# Patient Record
Sex: FEMALE | Race: WHITE | NOT HISPANIC OR LATINO
[De-identification: names, ages, dates, MRNs, and addresses within clinical notes are randomized per-mention and may not be internally consistent; named-entity substitution may affect disease eponyms.]

---

## 2024-10-08 ENCOUNTER — RX ONLY (OUTPATIENT)
Age: 59
Setting detail: RX ONLY
End: 2024-10-08

## 2024-10-08 ENCOUNTER — APPOINTMENT (RX ONLY)
Dept: URBAN - METROPOLITAN AREA CLINIC 61 | Facility: CLINIC | Age: 59
Setting detail: DERMATOLOGY
End: 2024-10-08

## 2024-10-08 DIAGNOSIS — L60.8 OTHER NAIL DISORDERS: ICD-10-CM

## 2024-10-08 DIAGNOSIS — L40.0 PSORIASIS VULGARIS: ICD-10-CM | Status: INADEQUATELY CONTROLLED

## 2024-10-08 DIAGNOSIS — L40.59 OTHER PSORIATIC ARTHROPATHY: ICD-10-CM

## 2024-10-08 PROCEDURE — 99204 OFFICE O/P NEW MOD 45 MIN: CPT

## 2024-10-08 PROCEDURE — ? COUNSELING

## 2024-10-08 PROCEDURE — ? PRESCRIPTION

## 2024-10-08 PROCEDURE — ? HUMIRA INITIATION

## 2024-10-08 PROCEDURE — ? ORDER TESTS

## 2024-10-08 RX ORDER — CLOBETASOL PROPIONATE 0.5 MG/G
CREAM TOPICAL BID
Qty: 60 | Refills: 12 | Status: ERX | COMMUNITY
Start: 2024-10-08

## 2024-10-08 RX ORDER — ADALIMUMAB 40MG/0.4ML
KIT SUBCUTANEOUS
Qty: 2 | Refills: 12 | Status: ERX | COMMUNITY
Start: 2024-10-08

## 2024-10-08 RX ADMIN — CLOBETASOL PROPIONATE: 0.5 CREAM TOPICAL at 00:00

## 2024-10-08 ASSESSMENT — LOCATION DETAILED DESCRIPTION DERM
LOCATION DETAILED: LEFT SECOND METACARPOPHALANGEAL JOINT
LOCATION DETAILED: LEFT KNEE JOINT
LOCATION DETAILED: RIGHT THUMBNAIL
LOCATION DETAILED: RIGHT FIRST METACARPOPHALANGEAL JOINT
LOCATION DETAILED: RIGHT THUMBNAIL
LOCATION DETAILED: RIGHT SUPRAPUBIC SKIN
LOCATION DETAILED: RIGHT INDEX FINGERNAIL
LOCATION DETAILED: LEFT THUMBNAIL
LOCATION DETAILED: LEFT ANTERIOR DISTAL THIGH
LOCATION DETAILED: PERIUNGUAL SKIN LEFT MIDDLE FINGER
LOCATION DETAILED: SUPERIOR MID FOREHEAD
LOCATION DETAILED: RIGHT MIDDLE FINGERNAIL
LOCATION DETAILED: LEFT THUMBNAIL
LOCATION DETAILED: PERIUMBILICAL SKIN
LOCATION DETAILED: UMBILICUS
LOCATION DETAILED: SACRAL SPINE
LOCATION DETAILED: LEFT INDEX FINGER LUNULA

## 2024-10-08 ASSESSMENT — LOCATION SIMPLE DESCRIPTION DERM
LOCATION SIMPLE: LEFT SECOND MP
LOCATION SIMPLE: ABDOMEN
LOCATION SIMPLE: LEFT THIGH
LOCATION SIMPLE: LOWER BACK
LOCATION SIMPLE: RIGHT THUMBNAIL
LOCATION SIMPLE: RIGHT FIRST MP
LOCATION SIMPLE: LEFT MIDDLE FINGER
LOCATION SIMPLE: SUPERIOR FOREHEAD
LOCATION SIMPLE: GROIN
LOCATION SIMPLE: ABDOMEN
LOCATION SIMPLE: RIGHT THUMBNAIL
LOCATION SIMPLE: LEFT THUMBNAIL
LOCATION SIMPLE: LEFT THUMBNAIL
LOCATION SIMPLE: RIGHT INDEX FINGER
LOCATION SIMPLE: LEFT INDEX FINGERNAIL
LOCATION SIMPLE: LEFT KNEE
LOCATION SIMPLE: RIGHT MIDDLE FINGER

## 2024-10-08 ASSESSMENT — PGA PSORIASIS: PGA PSORIASIS 2020: MODERATE

## 2024-10-08 ASSESSMENT — LOCATION ZONE DERM
LOCATION ZONE: FINGERNAIL
LOCATION ZONE: TRUNK
LOCATION ZONE: FACE
LOCATION ZONE: FINGER
LOCATION ZONE: TRUNK
LOCATION ZONE: KNEE
LOCATION ZONE: LEG

## 2024-10-08 ASSESSMENT — BSA PSORIASIS: % BODY COVERED IN PSORIASIS: 8

## 2024-10-08 NOTE — PROCEDURE: HUMIRA INITIATION
Detail Level: Zone
Pregnancy And Lactation Warning Text: This medication is Pregnancy Category B and is considered safe during pregnancy. It is unknown if this medication is excreted in breast milk.
Is Hydroxychloroquine Contraindicated?: No
Humira Monitoring Guidelines: A yearly test for tuberculosis is required while taking Humira.
Is Soriatane Contraindicated?: Yes
Humira Dosing: 80 mg SC day 0, 40 mg SC day 7, then 40 mg SC every other week
Diagnosis (Required): Psoriasis

## 2024-10-08 NOTE — PROCEDURE: ORDER TESTS
Lab Facility: [id number]
Performing Laboratory: [id number]
Expected Date Of Service: 10/08/2024
Bill For Surgical Tray: no
Billing Type: Third-Party Bill

## 2024-10-08 NOTE — PROCEDURE: COUNSELING
Detail Level: Detailed
Patient Specific Counseling (Will Not Stick From Patient To Patient): Keila has been following and managed with a local rheumatologist, until he heals recently moved. She has been being treated with methotrexate 10mg weekly x 4 months. She states she has no improvement in joint pain, especially in her fingers, She does still have skin plaques on her abdomen, scalp and finger nails. Keila also states she is still having joint pain, mainly in her knees and hands.  Today I will initiate Humira prescription.  I will send her for QuantiFERON gold tuberculosis testing today.
Patient Specific Counseling (Will Not Stick From Patient To Patient): Per above counseling, Keila has been on methotrexate for 4 months and not seeing any improvement in skin and joints, maybe only 10% improvement.  She states she is always nauseated and having GI issues with 10 mg weekly methotrexate.  She has stubborn areas of skin involvement, the umbilicus and pubic area, fingernails, and buttock crevice.  And small patch in her scalp.  She ideally for her joint protection needs to be on a biologic.  As I visually the sausage digits in her fingers as well as mild hypertrophy in her thumb joints.  Overall, for her skin and joint protection,  I would like to start her on Humira.

## 2024-10-09 ENCOUNTER — RX ONLY (OUTPATIENT)
Age: 59
Setting detail: RX ONLY
End: 2024-10-09

## 2024-10-09 RX ORDER — ADALIMUMAB 40MG/0.4ML
KIT SUBCUTANEOUS
Qty: 3 | Refills: 12 | Status: ERX

## 2024-10-21 ENCOUNTER — RX ONLY (OUTPATIENT)
Age: 59
Setting detail: RX ONLY
End: 2024-10-21

## 2024-10-21 RX ORDER — ADALIMUMAB 40MG/0.4ML
KIT SUBCUTANEOUS
Qty: 2 | Refills: 12 | Status: ERX | COMMUNITY
Start: 2024-10-21

## 2024-12-30 ENCOUNTER — APPOINTMENT (OUTPATIENT)
Dept: URBAN - METROPOLITAN AREA CLINIC 61 | Facility: CLINIC | Age: 59
Setting detail: DERMATOLOGY
End: 2024-12-30

## 2024-12-30 DIAGNOSIS — L60.8 OTHER NAIL DISORDERS: ICD-10-CM

## 2024-12-30 DIAGNOSIS — L40.59 OTHER PSORIATIC ARTHROPATHY: ICD-10-CM

## 2024-12-30 DIAGNOSIS — L40.0 PSORIASIS VULGARIS: ICD-10-CM

## 2024-12-30 PROCEDURE — ? HUMIRA MONITORING

## 2024-12-30 PROCEDURE — ? COUNSELING

## 2024-12-30 PROCEDURE — 99214 OFFICE O/P EST MOD 30 MIN: CPT

## 2024-12-30 ASSESSMENT — LOCATION SIMPLE DESCRIPTION DERM
LOCATION SIMPLE: LEFT MIDDLE FINGER
LOCATION SIMPLE: ABDOMEN
LOCATION SIMPLE: RIGHT FIRST MP
LOCATION SIMPLE: LOWER BACK
LOCATION SIMPLE: RIGHT MIDDLE FINGER
LOCATION SIMPLE: RIGHT INDEX FINGER
LOCATION SIMPLE: LEFT KNEE
LOCATION SIMPLE: GROIN
LOCATION SIMPLE: LEFT SECOND MP
LOCATION SIMPLE: LEFT INDEX FINGERNAIL
LOCATION SIMPLE: LEFT THUMBNAIL
LOCATION SIMPLE: RIGHT THUMBNAIL
LOCATION SIMPLE: RIGHT THUMBNAIL
LOCATION SIMPLE: LEFT THUMBNAIL

## 2024-12-30 ASSESSMENT — LOCATION DETAILED DESCRIPTION DERM
LOCATION DETAILED: RIGHT SUPRAPUBIC SKIN
LOCATION DETAILED: PERIUNGUAL SKIN LEFT MIDDLE FINGER
LOCATION DETAILED: LEFT SECOND METACARPOPHALANGEAL JOINT
LOCATION DETAILED: RIGHT THUMBNAIL
LOCATION DETAILED: RIGHT INDEX FINGERNAIL
LOCATION DETAILED: UMBILICUS
LOCATION DETAILED: LEFT INDEX FINGER LUNULA
LOCATION DETAILED: RIGHT FIRST METACARPOPHALANGEAL JOINT
LOCATION DETAILED: SACRAL SPINE
LOCATION DETAILED: RIGHT MIDDLE FINGERNAIL
LOCATION DETAILED: LEFT THUMBNAIL
LOCATION DETAILED: RIGHT THUMBNAIL
LOCATION DETAILED: LEFT KNEE JOINT
LOCATION DETAILED: LEFT THUMBNAIL

## 2024-12-30 ASSESSMENT — LOCATION ZONE DERM
LOCATION ZONE: FINGERNAIL
LOCATION ZONE: KNEE
LOCATION ZONE: FINGER
LOCATION ZONE: TRUNK

## 2024-12-30 NOTE — PROCEDURE: COUNSELING
Detail Level: Detailed
Patient Specific Counseling (Will Not Stick From Patient To Patient): Keila has been following and managed with a local rheumatologist, until he heals recently moved. She has been being treated with methotrexate 10mg weekly x 4 months. She states she has no improvement in joint pain, especially in her fingers, She does still have skin plaques on her abdomen, scalp and finger nails. Keila also states she is still having joint pain, mainly in her knees and hands.  Today I will initiate Humira prescription.  I will send her for QuantiFERON gold tuberculosis testing today.
Patient Specific Counseling (Will Not Stick From Patient To Patient): Keila has been on Humira for about a month and a half. She has noticed some improvement since starting however she is aware biological can take up to three months before truly seeing improvement.
Patient Specific Counseling (Will Not Stick From Patient To Patient): We discussed the option of having her thumb nail removed. At this time she is not interested in removal but she will continue to consider options.